# Patient Record
(demographics unavailable — no encounter records)

---

## 2025-07-10 NOTE — PHYSICAL EXAM
[Acute Distress] : no acute distress [Alert] : alert [Cerumen in canal] : no cerumen in canal [Clear] : right tympanic membrane clear [Clear to Auscultation Bilaterally] : clear to auscultation bilaterally [Wheezing] : no wheezing [Rales] : no rales [Tachypnea] : no tachypnea [Rhonchi] : no rhonchi [NL] : warm, clear [FreeTextEntry5] : Spectacles [de-identified] : volitional stridulous cough

## 2025-07-10 NOTE — DISCUSSION/SUMMARY
[FreeTextEntry1] : 12 yo cough 1-2 days, no fever, no meds PE appears well  volitional stridulous cough chest CTAB, no w/r/r suggest humidifier, T&H Rx Prednisone 20 mg bid x 4 days If symptoms worsen or concerned, call/return to office. Questions answered.

## 2025-07-10 NOTE — PHYSICAL EXAM
[Acute Distress] : no acute distress [Alert] : alert [Cerumen in canal] : no cerumen in canal [Clear] : right tympanic membrane clear [Clear to Auscultation Bilaterally] : clear to auscultation bilaterally [Wheezing] : no wheezing [Rales] : no rales [Tachypnea] : no tachypnea [Rhonchi] : no rhonchi [NL] : warm, clear [FreeTextEntry5] : Spectacles [de-identified] : volitional stridulous cough

## 2025-07-10 NOTE — CARE PLAN
[FreeTextEntry2] : take med as directed [FreeTextEntry3] : If symptoms worsen or concerned, call/return to office. Questions answered.

## 2025-07-10 NOTE — DISCUSSION/SUMMARY
[FreeTextEntry1] : 14 yo cough 1-2 days, no fever, no meds PE appears well  volitional stridulous cough chest CTAB, no w/r/r suggest humidifier, T&H Rx Prednisone 20 mg bid x 4 days If symptoms worsen or concerned, call/return to office. Questions answered.